# Patient Record
Sex: FEMALE | Race: WHITE | NOT HISPANIC OR LATINO | Employment: UNEMPLOYED | URBAN - METROPOLITAN AREA
[De-identification: names, ages, dates, MRNs, and addresses within clinical notes are randomized per-mention and may not be internally consistent; named-entity substitution may affect disease eponyms.]

---

## 2018-10-08 ENCOUNTER — TELEPHONE (OUTPATIENT)
Dept: INTERNAL MEDICINE | Facility: CLINIC | Age: 23
End: 2018-10-08

## 2018-10-08 DIAGNOSIS — Z29.9 PREVENTIVE MEASURE: Primary | ICD-10-CM

## 2018-10-08 NOTE — TELEPHONE ENCOUNTER
Alfred, I see this pt on Wednesday AM at 8AM.  She would like to do lab at [facility](Fax#: [phone]).  I have ordered/printed lab orders; would you please fax them to [facility] and let pt know.  Thanks so much.  
Lab orders faxed to requested number.   
Eyeglasses

## 2018-10-09 ENCOUNTER — TELEPHONE (OUTPATIENT)
Dept: INTERNAL MEDICINE | Facility: CLINIC | Age: 23
End: 2018-10-09

## 2018-10-09 NOTE — TELEPHONE ENCOUNTER
----- Message from Burt Goddard sent at 10/9/2018  9:50 AM CDT -----  Contact: [facility]/ Kelly [phone]  They nolonger do the H.Pylori antibody igg via blood anymore. They offer it via stool or breath test therefore, she needs to know which one you want her to do.    The patient is waiting.    Thank you

## 2018-10-10 NOTE — TELEPHONE ENCOUNTER
----- Message from Taina Johnson sent at 10/10/2018  9:39 AM CDT -----  Contact: self  Pt called in about wanting to reschedule appt, pt missed appt. Pt would like the nurse to give her a call back      Pt can be reached at [phone] ty

## 2018-10-10 NOTE — TELEPHONE ENCOUNTER
----- Message from Pili Waite sent at 10/10/2018  4:27 PM CDT -----  Contact: self/[phone]  Type: Returning a call    Who left a message?    When did the practice call? Today     Comments: Please advise.           Thanks

## 2018-10-17 ENCOUNTER — OFFICE VISIT (OUTPATIENT)
Dept: INTERNAL MEDICINE | Facility: CLINIC | Age: 23
End: 2018-10-17
Payer: COMMERCIAL

## 2018-10-17 VITALS
TEMPERATURE: 98 F | WEIGHT: 139.31 LBS | HEART RATE: 81 BPM | OXYGEN SATURATION: 99 % | DIASTOLIC BLOOD PRESSURE: 70 MMHG | BODY MASS INDEX: 24.68 KG/M2 | SYSTOLIC BLOOD PRESSURE: 110 MMHG | HEIGHT: 63 IN

## 2018-10-17 DIAGNOSIS — R76.8 POSITIVE ANA (ANTINUCLEAR ANTIBODY): ICD-10-CM

## 2018-10-17 DIAGNOSIS — E78.5 HYPERLIPIDEMIA, UNSPECIFIED HYPERLIPIDEMIA TYPE: Primary | ICD-10-CM

## 2018-10-17 DIAGNOSIS — Z76.89 ESTABLISHING CARE WITH NEW DOCTOR, ENCOUNTER FOR: ICD-10-CM

## 2018-10-17 DIAGNOSIS — K58.9 IRRITABLE BOWEL SYNDROME, UNSPECIFIED TYPE: ICD-10-CM

## 2018-10-17 PROCEDURE — 3008F BODY MASS INDEX DOCD: CPT | Mod: CPTII,S$GLB,, | Performed by: INTERNAL MEDICINE

## 2018-10-17 PROCEDURE — 99999 PR PBB SHADOW E&M-EST. PATIENT-LVL IV: CPT | Mod: PBBFAC,,, | Performed by: INTERNAL MEDICINE

## 2018-10-17 PROCEDURE — 99204 OFFICE O/P NEW MOD 45 MIN: CPT | Mod: S$GLB,,, | Performed by: INTERNAL MEDICINE

## 2018-10-17 RX ORDER — DROSPIRENONE AND ETHINYL ESTRADIOL 0.02-3(28)
1 KIT ORAL DAILY
COMMUNITY

## 2018-10-17 NOTE — PROGRESS NOTES
REASON FOR VISIT:  Alba is a very sweet 23-year-old female, new to me, who   presents today to establish care.    CHIEF COMPLAINT:  Establish care as a new patient.    HISTORY OF PRESENT ILLNESS:  Alba presents for the above.  She has actually   had lab work done at the [facility] to be reviewed.  She was scheduled to this   appointment at her mom's request after an episode of anxiety this past spring.    She was actually living in [place], [place] and attending the [facility] [facility] [employer].  She was having a lot of anxiety at that time with palpitations.    She went to the Emergency Room followed by seeing a cardiologist and had a   negative Emergency Room workup, Holter study and EKG performed.  She notes that   since then, she has moved back to [facility].  She is applying to start a masters   program.  She is living at home and working to try and save some money up.  She   notes she is doing much better.  Her anxiety has not really been an issue.  She   has no present complaints of anxiety or depression, feels good.  She does have a   history of positive ROSEMARY in the past.  She has no arthralgias, no rash, no   fevers or other abnormalities.  She notes she has a history of irritable bowel   syndrome as well.  She does not believe she has ever had a colonoscopy.  No   acute problems with this at this point.    PAST MEDICAL, SURGICAL AND SOCIAL HISTORY:  Please see as thoroughly stated in   EPIC chart, which has been reviewed.    REVIEW OF SYSTEMS:  HEENT:  Negative for headaches or change in vision.  No oral or dental pain.  CARDIOPULMONARY:  No chest pain.  No shortness of breath.  GASTROINTESTINAL:  Positive for occasional constipation with irritable bowel   symptoms.  No diarrhea.  No blood per rectum.  GYNECOLOGIC:  No change in bowel habits.  No menstrual difficulties at this   point.  Menses are controlled on present Maci birth control.  EXTREMITIES:  Negative for swelling.  RHEUMATOLOGIC AND ORTHOPEDIC:   No joint pains.  No limb pain.  NEUROLOGIC:  No dizziness.  No focal neurological abnormalities.  Rest of review of systems is noncontributory.    PHYSICAL EXAMINATION:  VITAL SIGNS:  Weight is 139 pounds, blood pressure is 110/70, pulse is 81 and   pulse ox is 99%.  GENERAL:  The patient looks comfortable.  HEENT:  Grossly unremarkable.  The patient does have a very small nasal ring in   place.  No evidence of infection or drainage.  Oropharynx is clear.  NECK:  Supple.  Negative for masses or thyromegaly.  Negative for   lymphadenopathy.  LUNGS:  Clear.  HEART:  Regular rate and rhythm without arrhythmias, murmurs or gallops.  ABDOMEN:  Positive bowel sounds, soft and nontender.  No masses or organomegaly.  BREASTS:  On gross visualization are normal.  On palpation, no masses or   discharge from the nipples.  EXTREMITIES:  Negative for edema.  RHEUMATOLOGIC:  Within normal limits.    WORKUP:  Lab work ordered/one at [email or web address] on 10/09/2018, showing total   cholesterol 273 with LDL cholesterol 167.  Comprehensive chemistry is   unremarkable, TSH is unremarkable.  CBC is unremarkable.  Ferritin is low at 7.    Vitamin B12 is normal.  Vitamin D is low normal at 30.    ASSESSMENT AND PLAN:  1.  Hyperlipidemia in a patient presently on a Pescatarian diet with very little   dairy product in the diet.  A.  We will recheck fasting lipid panel to ensure this is not a lab error.  B.  We will phone review after the above and determine further course of   therapy, although given the patient's age and healthy status, healthy diet and   regular exercise program, not sure that further intervention required yet.  2.  Positive ROSEMARY, clinically asymptomatic.  A.  We will recheck with antinuclear antibody titers.  3.  Irritable bowel syndrome with constipation.  A.  I have recommended a trial of Benefiber 1 packet in bottle of water, food or   drink daily.  B.  I have also recommended the patient consider a trial of  probiotics, either   Culturelle or Ultimate Elsa 10 to 15 billion daily.  4.  Iron deficiency, probably secondary to diet.  A.  I have recommended the patient take a Slow Fe (iron) tablet once daily.  5.  Health maintenance.  I have encouraged the patient to schedule annual   gynecologic appointment and establish with new doctor now that living in [place].  B.  We will try and obtain outside records from workup in [place].  C.  Phone review after repeat lab work.      FMS/IN  dd: 10/17/2018 18:00:07 (CDT)  td: 10/18/2018 15:58:02 (CDT)  Doc ID   #[id number]  Job ID #[id number]    CC:     This office note has been dictated.

## 2018-10-17 NOTE — PATIENT INSTRUCTIONS
For Irritable Bowel Syndrome:  #1-Benefiber 1 packet in bottle water or with food/drink daily +/-       Probiotics daily(Ex Cultural or Ultimate Elsa at 10-15 billion daily)

## 2019-11-12 ENCOUNTER — TELEPHONE (OUTPATIENT)
Dept: INTERNAL MEDICINE | Facility: CLINIC | Age: 24
End: 2019-11-12

## 2019-11-12 DIAGNOSIS — Z29.9 PREVENTIVE MEASURE: Primary | ICD-10-CM

## 2019-11-12 NOTE — TELEPHONE ENCOUNTER
----- Message from Colleen Fischer sent at 11/12/2019  9:08 AM CST -----  Contact: [phone]  Patient is requesting to have a Tdap shot.  Please advise, thanks

## 2019-11-19 ENCOUNTER — CLINICAL SUPPORT (OUTPATIENT)
Dept: INTERNAL MEDICINE | Facility: CLINIC | Age: 24
End: 2019-11-19
Payer: COMMERCIAL

## 2019-11-19 PROCEDURE — 90715 TDAP VACCINE GREATER THAN OR EQUAL TO 7YO IM: ICD-10-PCS | Mod: S$GLB,,, | Performed by: INTERNAL MEDICINE

## 2019-11-19 PROCEDURE — 90471 IMMUNIZATION ADMIN: CPT | Mod: S$GLB,,, | Performed by: INTERNAL MEDICINE

## 2019-11-19 PROCEDURE — 90715 TDAP VACCINE 7 YRS/> IM: CPT | Mod: S$GLB,,, | Performed by: INTERNAL MEDICINE

## 2019-11-19 PROCEDURE — 90471 TDAP VACCINE GREATER THAN OR EQUAL TO 7YO IM: ICD-10-PCS | Mod: S$GLB,,, | Performed by: INTERNAL MEDICINE

## 2019-12-02 ENCOUNTER — TELEPHONE (OUTPATIENT)
Dept: INTERNAL MEDICINE | Facility: CLINIC | Age: 24
End: 2019-12-02

## 2019-12-02 DIAGNOSIS — M25.50 ARTHRALGIA, UNSPECIFIED JOINT: Primary | ICD-10-CM

## 2019-12-02 NOTE — TELEPHONE ENCOUNTER
Alba calls and requests referral to  Dr Carol Moore, her Rheumatologist.  I have placed.  Alfred, please fax referral to on pt to Dr Abigail Moore at office phone # [phone] [address].  Thanks

## 2020-01-20 ENCOUNTER — TELEPHONE (OUTPATIENT)
Dept: INTERNAL MEDICINE | Facility: CLINIC | Age: 25
End: 2020-01-20

## 2020-01-21 PROBLEM — Z76.89 ESTABLISHING CARE WITH NEW DOCTOR, ENCOUNTER FOR: Status: RESOLVED | Noted: 2018-10-17 | Resolved: 2020-01-21

## 2020-01-21 NOTE — PROGRESS NOTES
"Subjective:      Patient ID: Alba Lopez is a 24 y.o. female.    Chief Complaint: Chest Pain (chest tightness after eating, some relief with burping, upset stomach-sour feeling, nuasea) and Gastroesophageal Reflux    Ms Willis is an established patient of Dr Andino. She is new to me.  She is here to be evaluated for GERD symptoms    She has a past medical history of IBS (irritable bowel syndrome) and Mixed anxiety and depressive disorder.    Her symptoms have been present for the last 4-5 days. She states that right after eating she feels a chest tightness. Burping helps. She is nauseous at times and feels like she has a "sour stomach". She states two days ago she also had a lump in her throat sensation after eating. It is not resolved. Of note, she typically eats a healthy plant based diet but she is staying at her mom and has had foods that have been outside her norm. She drinks herbal tea with marshmallow mer, slippery herb, and liquorice. She states that this has helped a little. Tums have also provided her with some relief.    She is active. She continues to run 2 miles 2-3 week with stretching and core exercises.  She is up to date on immunizations and reports having flu vaccine.  She has never had a colonoscopy.  She does report having a scope done at an outside facility in the past as a teenager, gall bladder subsequently removed.    Discussed Essence's symptoms with PCP. Recommend starting PPI for now, if no improvement will plan to scope.    Gastroesophageal Reflux   She complains of belching, chest pain, coughing (dry cough), globus sensation and nausea. She reports no abdominal pain, no choking, no dysphagia, no heartburn, no hoarse voice, no sore throat, no stridor, no tooth decay, no water brash or no wheezing. lump in throat noted after food . This is a new problem. The current episode started in the past 7 days. The problem has been gradually improving. The symptoms are aggravated by certain " foods and lying down. Associated symptoms include anemia. Pertinent negatives include no fatigue, melena, muscle weakness, orthopnea or weight loss. There are no known risk factors. She has tried an antacid for the symptoms. The treatment provided mild relief. Past procedures do not include an abdominal ultrasound. Past invasive treatments do not include gastroplasty, gastroplication or reflux surgery. hx of gallbladder removal.       Review of Systems   Constitutional: Negative for fatigue and weight loss.   HENT: Negative for hoarse voice and sore throat.    Respiratory: Positive for cough (dry cough). Negative for choking and wheezing.    Cardiovascular: Positive for chest pain.   Gastrointestinal: Positive for nausea. Negative for abdominal pain, dysphagia, heartburn and melena.   Musculoskeletal: Negative for muscle weakness.       Review of patient's allergies indicates:  No Known Allergies    Current Outpatient Medications   Medication Sig Dispense Refill    drospirenone-ethinyl estradiol (MONICA) 3-0.02 mg per tablet Take 1 tablet by mouth once daily.      MULTIVITAMIN ORAL Take 1 tablet by mouth once daily. Ritual brand      NON FORMULARY MEDICATION [place]uber      esomeprazole (NEXIUM) 20 MG capsule Take 1 capsule (20 mg total) by mouth before breakfast. 30 capsule 3     No current facility-administered medications for this visit.        There are no active problems to display for this patient.      Past Medical History:   Diagnosis Date    IBS (irritable bowel syndrome)     Mixed anxiety and depressive disorder     Situational       Past Surgical History:   Procedure Laterality Date    CHOLECYSTECTOMY         Family History   Problem Relation Age of Onset    Hyperlipidemia Mother     Crohn's disease Brother     Hypertension Maternal Grandmother     Cancer Maternal Grandfather         Colon Cancer         Objective:     Lab Results   Component Value Date    WBC 5.76 01/22/2020    HGB 14.1 01/22/2020  "   HCT 43.9 01/22/2020     01/22/2020    CHOL 277 (H) 01/22/2020    TRIG 223 (H) 01/22/2020    HDL 70 01/22/2020    ALT 11 01/22/2020    AST 16 01/22/2020     01/22/2020    K 4.7 01/22/2020     01/22/2020    CREATININE 0.8 01/22/2020    BUN 6 01/22/2020    CO2 26 01/22/2020       Vitals:    01/22/20 0840   BP: 122/74   Pulse: 74   SpO2: 98%   Weight: 62.9 kg (138 lb 10.7 oz)   Height: 5' 3" (1.6 m)   PainSc: 0-No pain       Body mass index is 24.56 kg/m².    Physical Exam   Constitutional: She is oriented to person, place, and time. She appears well-developed and well-nourished.   HENT:   Head: Normocephalic and atraumatic.   Eyes: Conjunctivae and EOM are normal.   Neck: Normal range of motion. No thyromegaly present.   Cardiovascular: Normal rate, regular rhythm, normal heart sounds and intact distal pulses.   Pulmonary/Chest: Effort normal and breath sounds normal. No respiratory distress.   Abdominal: Soft. Bowel sounds are normal. She exhibits no distension and no mass. There is no tenderness. There is no rebound and no guarding. No hernia.   Musculoskeletal: Normal range of motion.   Neurological: She is alert and oriented to person, place, and time.   Skin: Skin is warm and dry.   Psychiatric: She has a normal mood and affect. Her behavior is normal. Judgment and thought content normal.     Assessment:     1. Health care maintenance    2. Gastroesophageal reflux disease, esophagitis presence not specified      Plan:     Alba was seen today for chest pain and gastroesophageal reflux.    Diagnoses and all orders for this visit:    Health care maintenance  -     Comprehensive metabolic panel; Future  -     CBC auto differential; Future  -     TSH; Future  -     Lipid panel; Future  -     Vitamin D; Future  -     Iron; Future  -     Ferritin; Future    Gastroesophageal reflux disease, esophagitis presence not specified  -     esomeprazole (NEXIUM) 20 MG capsule; Take 1 capsule (20 mg total) " by mouth before breakfast.      GERD:  Lifestyle and dietary modifications  Antacids at time of meals  PPI trial for 6 weeks  Return if not impoved.  Baseline labs today  Health Maintenance   Topic Date Due    Lipid Panel  1994    Pap Smear  04/22/2015    TETANUS VACCINE  11/19/2029    HPV Vaccines  Completed       Patient Instructions     Labs today.  Nexium 20 mg daily for 6 weeks. Will scope if no improvement.        Tips to Control Acid Reflux    To control acid reflux, youll need to make some basic diet and lifestyle changes. The simple steps outlined below may be all youll need to ease discomfort.  Watch what you eat  · Avoid fatty foods and spicy foods.  · Eat fewer acidic foods, such as citrus and tomato-based foods. These can increase symptoms.  · Limit drinking alcohol, caffeine, and fizzy beverages. All increase acid reflux.  · Try limiting chocolate, peppermint, and spearmint. These can worsen acid reflux in some people.  Watch when you eat  · Avoid lying down for 3 hours after eating.  · Do not snack before going to bed.  Raise your head  Raising your head and upper body by 4 to 6 inches helps limit reflux when youre lying down. Put blocks under the head of your bed frame to raise it.  Other changes  · Lose weight, if you need to  · Dont exercise near bedtime  · Avoid tight-fitting clothes  · Limit aspirin and ibuprofen  · Stop smoking   Date Last Reviewed: 7/1/2016 © 2000-2017 [employer]. [address] [place] [address], [place] [address]. All rights reserved. This information is not intended as a substitute for professional medical care. Always follow your healthcare professional's instructions.        Medicines for Acid Reflux  Your healthcare provider has told you that you have acid reflux. This condition causes stomach acid to wash up into your throat. For most people, acid reflux is troubling but not dangerous. But left untreated, acid reflux sometimes damages the esophagus.  Medicines can help control acid reflux and limit your risk of future problems.  Medicines for acid reflux  Your healthcare provider may prescribe medicine to help treat your acid reflux. Medicine will be based on your symptoms and any test results. Your provider will explain how to take your medicine. You will also be told about possible side effects.  Reducing stomach acid  Your provider may suggest antacids that you can buy over the counter. Antacids can give fast relief. Or you may be told to take a type of medicine called H2 blockers. These are available over the counter and by prescription (for higher doses).  Blocking stomach acid  In more severe cases, your healthcare provider may suggest stronger medicines such as proton pump inhibitors (PPIs). These keep the stomach from making acid. They are often prescribed for long-term use.  Other medicines  In some cases medicines to reduce or block stomach acid may not work. Then you may be switched to another type of medicine that helps your stomach empty better.     Date Last Reviewed: 10/1/2016  © 2928-7777 [employer]. [address] [place] [address], [place] [address]. All rights reserved. This information is not intended as a substitute for professional medical care. Always follow your healthcare professional's instructions.        GERD (Adult)    The esophagus is a tube that carries food from the mouth to the stomach. A valve at the lower end of the esophagus prevents stomach acid from flowing upward. When this valve doesn't work properly, stomach contents may repeatedly flow back up (reflux) into the esophagus. This is called gastroesophageal reflux disease (GERD). GERD can irritate the esophagus. It can cause problems with swallowing or breathing. In severe cases, GERD can cause recurrent pneumonia or other serious problems.  Symptoms of reflux include burning, pressure or sharp pain in the upper abdomen or mid to lower chest. The pain can spread to the  "neck, back, or shoulder. There may be belching, an acid taste in the back of the throat, chronic cough, or sore throat or hoarseness. GERD symptoms often occur during the day after a big meal. They can also occur at night when lying down.   Home care  Lifestyle changes can help reduce symptoms. If needed, medicines may be prescribed. Symptoms often improve with treatment, but if treatment is stopped, the symptoms often return after a few months. So most persons with GERD will need to continue treatment.  Lifestyle changes  · Limit or avoid fatty, fried, and spicy foods, as well as coffee, chocolate, mint, and foods with high acid content such as tomatoes and citrus fruit and juices (orange, grapefruit, lemon).  · Dont eat large meals, especially at night. Frequent, smaller meals are best. Do not lie down right after eating. And dont eat anything 3 hours before going to bed.  · Avoid drinking alcohol and smoking. As much as possible, stay away from second hand smoke.  · If you are overweight, losing weight will reduce symptoms.   · Avoid wearing tight clothing around your stomach area.  · If your symptoms occur during sleep, use a foam wedge to elevate your upper body (not just your head.) Or, place 4" blocks under the head of your bed.  Medicines  If needed, medicines can help relieve the symptoms of GERD and prevent damage to the esophagus. Discuss a medicine plan with your healthcare provider. This may include one or more of the following medicines:  · Antacids to help neutralize the normal acids in your stomach.  · Acid blockers (H2 blockers) to decrease acid production.  · Acid inhibitors (PPIs) to decrease acid production in a different way than the blockers. They may work better, but can take a little longer to take effect.  Take an antacid 30-60 minutes after eating and at bedtime, but not at the same time as an acid blocker.  Try not to take medicines such as ibuprofen and aspirin. If you are taking " aspirin for your heart or other medical reasons, talk to your healthcare provider about stopping it.  Follow-up care  Follow up with your healthcare provider or as advised by our staff.  When to seek medical advice  Call your healthcare provider if any of the following occur:  · Stomach pain gets worse or moves to the lower right abdomen (appendix area)  · Chest pain appears or gets worse, or spreads to the back, neck, shoulder, or arm  · Frequent vomiting (cant keep down liquids)  · Blood in the stool or vomit (red or black in color)  · Feeling weak or dizzy  · Fever of 100.4ºF (38ºC) or higher, or as directed by your healthcare provider  Date Last Reviewed: 6/23/2015  © 6780-6021 [employer]. [address] [place] [address], [place] [address]. All rights reserved. This information is not intended as a substitute for professional medical care. Always follow your healthcare professional's instructions.

## 2020-01-21 NOTE — PATIENT INSTRUCTIONS
Labs today.  Nexium 20 mg daily for 6 weeks. Will scope if no improvement.  Will repeat lipid panel next week.    GERD:  Lifestyle and dietary modifications  PPI trial for 6 weeks  Return if not impoved.  Baseline labs today  Report if symptoms worsen or fail to improve.      Tips to Control Acid Reflux    To control acid reflux, youll need to make some basic diet and lifestyle changes. The simple steps outlined below may be all youll need to ease discomfort.  Watch what you eat  · Avoid fatty foods and spicy foods.  · Eat fewer acidic foods, such as citrus and tomato-based foods. These can increase symptoms.  · Limit drinking alcohol, caffeine, and fizzy beverages. All increase acid reflux.  · Try limiting chocolate, peppermint, and spearmint. These can worsen acid reflux in some people.  Watch when you eat  · Avoid lying down for 3 hours after eating.  · Do not snack before going to bed.  Raise your head  Raising your head and upper body by 4 to 6 inches helps limit reflux when youre lying down. Put blocks under the head of your bed frame to raise it.  Other changes  · Lose weight, if you need to  · Dont exercise near bedtime  · Avoid tight-fitting clothes  · Limit aspirin and ibuprofen  · Stop smoking   Date Last Reviewed: 7/1/2016  © 8306-5674 [employer]. [address] [place] [address], [place] [address]. All rights reserved. This information is not intended as a substitute for professional medical care. Always follow your healthcare professional's instructions.        Medicines for Acid Reflux  Your healthcare provider has told you that you have acid reflux. This condition causes stomach acid to wash up into your throat. For most people, acid reflux is troubling but not dangerous. But left untreated, acid reflux sometimes damages the esophagus. Medicines can help control acid reflux and limit your risk of future problems.  Medicines for acid reflux  Your healthcare provider may prescribe medicine to  help treat your acid reflux. Medicine will be based on your symptoms and any test results. Your provider will explain how to take your medicine. You will also be told about possible side effects.  Reducing stomach acid  Your provider may suggest antacids that you can buy over the counter. Antacids can give fast relief. Or you may be told to take a type of medicine called H2 blockers. These are available over the counter and by prescription (for higher doses).  Blocking stomach acid  In more severe cases, your healthcare provider may suggest stronger medicines such as proton pump inhibitors (PPIs). These keep the stomach from making acid. They are often prescribed for long-term use.  Other medicines  In some cases medicines to reduce or block stomach acid may not work. Then you may be switched to another type of medicine that helps your stomach empty better.     Date Last Reviewed: 10/1/2016  © 9979-0090 [employer]. [address] [place] [address]. All rights reserved. This information is not intended as a substitute for professional medical care. Always follow your healthcare professional's instructions.        GERD (Adult)    The esophagus is a tube that carries food from the mouth to the stomach. A valve at the lower end of the esophagus prevents stomach acid from flowing upward. When this valve doesn't work properly, stomach contents may repeatedly flow back up (reflux) into the esophagus. This is called gastroesophageal reflux disease (GERD). GERD can irritate the esophagus. It can cause problems with swallowing or breathing. In severe cases, GERD can cause recurrent pneumonia or other serious problems.  Symptoms of reflux include burning, pressure or sharp pain in the upper abdomen or mid to lower chest. The pain can spread to the neck, back, or shoulder. There may be belching, an acid taste in the back of the throat, chronic cough, or sore throat or hoarseness. GERD symptoms often  "occur during the day after a big meal. They can also occur at night when lying down.   Home care  Lifestyle changes can help reduce symptoms. If needed, medicines may be prescribed. Symptoms often improve with treatment, but if treatment is stopped, the symptoms often return after a few months. So most persons with GERD will need to continue treatment.  Lifestyle changes  · Limit or avoid fatty, fried, and spicy foods, as well as coffee, chocolate, mint, and foods with high acid content such as tomatoes and citrus fruit and juices (orange, grapefruit, lemon).  · Dont eat large meals, especially at night. Frequent, smaller meals are best. Do not lie down right after eating. And dont eat anything 3 hours before going to bed.  · Avoid drinking alcohol and smoking. As much as possible, stay away from second hand smoke.  · If you are overweight, losing weight will reduce symptoms.   · Avoid wearing tight clothing around your stomach area.  · If your symptoms occur during sleep, use a foam wedge to elevate your upper body (not just your head.) Or, place 4" blocks under the head of your bed.  Medicines  If needed, medicines can help relieve the symptoms of GERD and prevent damage to the esophagus. Discuss a medicine plan with your healthcare provider. This may include one or more of the following medicines:  · Antacids to help neutralize the normal acids in your stomach.  · Acid blockers (H2 blockers) to decrease acid production.  · Acid inhibitors (PPIs) to decrease acid production in a different way than the blockers. They may work better, but can take a little longer to take effect.  Take an antacid 30-60 minutes after eating and at bedtime, but not at the same time as an acid blocker.  Try not to take medicines such as ibuprofen and aspirin. If you are taking aspirin for your heart or other medical reasons, talk to your healthcare provider about stopping it.  Follow-up care  Follow up with your healthcare provider or " as advised by our staff.  When to seek medical advice  Call your healthcare provider if any of the following occur:  · Stomach pain gets worse or moves to the lower right abdomen (appendix area)  · Chest pain appears or gets worse, or spreads to the back, neck, shoulder, or arm  · Frequent vomiting (cant keep down liquids)  · Blood in the stool or vomit (red or black in color)  · Feeling weak or dizzy  · Fever of 100.4ºF (38ºC) or higher, or as directed by your healthcare provider  Date Last Reviewed: 6/23/2015  © 1897-3855 [employer]. [address] [place] [address]. All rights reserved. This information is not intended as a substitute for professional medical care. Always follow your healthcare professional's instructions.

## 2020-01-21 NOTE — TELEPHONE ENCOUNTER
Alfred/Salvatore, this pt is having GERD like symptoms.  Does Radha have time to see her this Wednesday when I'm here?

## 2020-01-22 ENCOUNTER — OFFICE VISIT (OUTPATIENT)
Dept: INTERNAL MEDICINE | Facility: CLINIC | Age: 25
End: 2020-01-22
Payer: COMMERCIAL

## 2020-01-22 ENCOUNTER — LAB VISIT (OUTPATIENT)
Dept: LAB | Facility: HOSPITAL | Age: 25
End: 2020-01-22
Attending: INTERNAL MEDICINE
Payer: COMMERCIAL

## 2020-01-22 VITALS
DIASTOLIC BLOOD PRESSURE: 74 MMHG | OXYGEN SATURATION: 98 % | BODY MASS INDEX: 24.57 KG/M2 | HEART RATE: 74 BPM | WEIGHT: 138.69 LBS | HEIGHT: 63 IN | SYSTOLIC BLOOD PRESSURE: 122 MMHG

## 2020-01-22 DIAGNOSIS — K21.9 GASTROESOPHAGEAL REFLUX DISEASE, ESOPHAGITIS PRESENCE NOT SPECIFIED: ICD-10-CM

## 2020-01-22 DIAGNOSIS — Z00.00 HEALTH CARE MAINTENANCE: Primary | ICD-10-CM

## 2020-01-22 DIAGNOSIS — Z00.00 HEALTH CARE MAINTENANCE: ICD-10-CM

## 2020-01-22 LAB
25(OH)D3+25(OH)D2 SERPL-MCNC: 33 NG/ML (ref 30–96)
ALBUMIN SERPL BCP-MCNC: 3.9 G/DL (ref 3.5–5.2)
ALP SERPL-CCNC: 114 U/L (ref 55–135)
ALT SERPL W/O P-5'-P-CCNC: 11 U/L (ref 10–44)
ANION GAP SERPL CALC-SCNC: 9 MMOL/L (ref 8–16)
AST SERPL-CCNC: 16 U/L (ref 10–40)
BASOPHILS # BLD AUTO: 0.05 K/UL (ref 0–0.2)
BASOPHILS NFR BLD: 0.9 % (ref 0–1.9)
BILIRUB SERPL-MCNC: 0.3 MG/DL (ref 0.1–1)
BUN SERPL-MCNC: 6 MG/DL (ref 6–20)
CALCIUM SERPL-MCNC: 9.9 MG/DL (ref 8.7–10.5)
CHLORIDE SERPL-SCNC: 107 MMOL/L (ref 95–110)
CHOLEST SERPL-MCNC: 277 MG/DL (ref 120–199)
CHOLEST/HDLC SERPL: 4 {RATIO} (ref 2–5)
CO2 SERPL-SCNC: 26 MMOL/L (ref 23–29)
CREAT SERPL-MCNC: 0.8 MG/DL (ref 0.5–1.4)
DIFFERENTIAL METHOD: NORMAL
EOSINOPHIL # BLD AUTO: 0.1 K/UL (ref 0–0.5)
EOSINOPHIL NFR BLD: 1 % (ref 0–8)
ERYTHROCYTE [DISTWIDTH] IN BLOOD BY AUTOMATED COUNT: 12.8 % (ref 11.5–14.5)
EST. GFR  (AFRICAN AMERICAN): >60 ML/MIN/1.73 M^2
EST. GFR  (NON AFRICAN AMERICAN): >60 ML/MIN/1.73 M^2
FERRITIN SERPL-MCNC: 11 NG/ML (ref 20–300)
GLUCOSE SERPL-MCNC: 84 MG/DL (ref 70–110)
HCT VFR BLD AUTO: 43.9 % (ref 37–48.5)
HDLC SERPL-MCNC: 70 MG/DL (ref 40–75)
HDLC SERPL: 25.3 % (ref 20–50)
HGB BLD-MCNC: 14.1 G/DL (ref 12–16)
IMM GRANULOCYTES # BLD AUTO: 0.01 K/UL (ref 0–0.04)
IMM GRANULOCYTES NFR BLD AUTO: 0.2 % (ref 0–0.5)
IRON SERPL-MCNC: 52 UG/DL (ref 30–160)
LDLC SERPL CALC-MCNC: 162.4 MG/DL (ref 63–159)
LYMPHOCYTES # BLD AUTO: 2 K/UL (ref 1–4.8)
LYMPHOCYTES NFR BLD: 34 % (ref 18–48)
MCH RBC QN AUTO: 28.3 PG (ref 27–31)
MCHC RBC AUTO-ENTMCNC: 32.1 G/DL (ref 32–36)
MCV RBC AUTO: 88 FL (ref 82–98)
MONOCYTES # BLD AUTO: 0.4 K/UL (ref 0.3–1)
MONOCYTES NFR BLD: 6.9 % (ref 4–15)
NEUTROPHILS # BLD AUTO: 3.3 K/UL (ref 1.8–7.7)
NEUTROPHILS NFR BLD: 57 % (ref 38–73)
NONHDLC SERPL-MCNC: 207 MG/DL
NRBC BLD-RTO: 0 /100 WBC
PLATELET # BLD AUTO: 249 K/UL (ref 150–350)
PMV BLD AUTO: 10.9 FL (ref 9.2–12.9)
POTASSIUM SERPL-SCNC: 4.7 MMOL/L (ref 3.5–5.1)
PROT SERPL-MCNC: 7.8 G/DL (ref 6–8.4)
RBC # BLD AUTO: 4.98 M/UL (ref 4–5.4)
SODIUM SERPL-SCNC: 142 MMOL/L (ref 136–145)
TRIGL SERPL-MCNC: 223 MG/DL (ref 30–150)
TSH SERPL DL<=0.005 MIU/L-ACNC: 2.65 UIU/ML (ref 0.4–4)
WBC # BLD AUTO: 5.76 K/UL (ref 3.9–12.7)

## 2020-01-22 PROCEDURE — 3008F PR BODY MASS INDEX (BMI) DOCUMENTED: ICD-10-PCS | Mod: CPTII,S$GLB,, | Performed by: NURSE PRACTITIONER

## 2020-01-22 PROCEDURE — 85025 COMPLETE CBC W/AUTO DIFF WBC: CPT

## 2020-01-22 PROCEDURE — 99214 OFFICE O/P EST MOD 30 MIN: CPT | Mod: S$GLB,,, | Performed by: NURSE PRACTITIONER

## 2020-01-22 PROCEDURE — 82306 VITAMIN D 25 HYDROXY: CPT

## 2020-01-22 PROCEDURE — 99999 PR PBB SHADOW E&M-EST. PATIENT-LVL III: ICD-10-PCS | Mod: PBBFAC,,, | Performed by: NURSE PRACTITIONER

## 2020-01-22 PROCEDURE — 84443 ASSAY THYROID STIM HORMONE: CPT

## 2020-01-22 PROCEDURE — 99214 PR OFFICE/OUTPT VISIT, EST, LEVL IV, 30-39 MIN: ICD-10-PCS | Mod: S$GLB,,, | Performed by: NURSE PRACTITIONER

## 2020-01-22 PROCEDURE — 80061 LIPID PANEL: CPT

## 2020-01-22 PROCEDURE — 80053 COMPREHEN METABOLIC PANEL: CPT

## 2020-01-22 PROCEDURE — 99999 PR PBB SHADOW E&M-EST. PATIENT-LVL III: CPT | Mod: PBBFAC,,, | Performed by: NURSE PRACTITIONER

## 2020-01-22 PROCEDURE — 36415 COLL VENOUS BLD VENIPUNCTURE: CPT

## 2020-01-22 PROCEDURE — 82728 ASSAY OF FERRITIN: CPT

## 2020-01-22 PROCEDURE — 3008F BODY MASS INDEX DOCD: CPT | Mod: CPTII,S$GLB,, | Performed by: NURSE PRACTITIONER

## 2020-01-22 PROCEDURE — 83540 ASSAY OF IRON: CPT

## 2020-01-22 RX ORDER — HYDROGEN PEROXIDE 3 %
20 SOLUTION, NON-ORAL MISCELLANEOUS
Qty: 30 CAPSULE | Refills: 3 | Status: SHIPPED | OUTPATIENT
Start: 2020-01-22 | End: 2020-03-12

## 2020-01-24 DIAGNOSIS — E78.1 HYPERTRIGLYCERIDEMIA: Primary | ICD-10-CM

## 2020-01-24 NOTE — PROGRESS NOTES
Patient has elevated triglycerides.  Will repeat labs  
What Type Of Note Output Would You Prefer (Optional)?: Bullet Format
How Severe Is Your Acne?: mild
Is This A New Presentation, Or A Follow-Up?: Acne

## 2020-01-27 ENCOUNTER — LAB VISIT (OUTPATIENT)
Dept: LAB | Facility: HOSPITAL | Age: 25
End: 2020-01-27
Attending: NURSE PRACTITIONER
Payer: COMMERCIAL

## 2020-01-27 ENCOUNTER — TELEPHONE (OUTPATIENT)
Dept: INTERNAL MEDICINE | Facility: CLINIC | Age: 25
End: 2020-01-27

## 2020-01-27 ENCOUNTER — PATIENT MESSAGE (OUTPATIENT)
Dept: INTERNAL MEDICINE | Facility: CLINIC | Age: 25
End: 2020-01-27

## 2020-01-27 DIAGNOSIS — E78.1 HYPERTRIGLYCERIDEMIA: ICD-10-CM

## 2020-01-27 LAB
CHOLEST SERPL-MCNC: 249 MG/DL (ref 120–199)
CHOLEST/HDLC SERPL: 3.9 {RATIO} (ref 2–5)
HDLC SERPL-MCNC: 64 MG/DL (ref 40–75)
HDLC SERPL: 25.7 % (ref 20–50)
LDLC SERPL CALC-MCNC: 140.8 MG/DL (ref 63–159)
NONHDLC SERPL-MCNC: 185 MG/DL
TRIGL SERPL-MCNC: 221 MG/DL (ref 30–150)

## 2020-01-27 PROCEDURE — 36415 COLL VENOUS BLD VENIPUNCTURE: CPT

## 2020-01-27 PROCEDURE — 80061 LIPID PANEL: CPT

## 2020-01-27 NOTE — TELEPHONE ENCOUNTER
----- Message from Carina Loyola MA sent at 1/27/2020  8:09 AM CST -----  Contact: [phone]  Patient was seen on 01/22/2020....Patient was prescribe (NEXIUM) 20 MG capsule.  She is having side affects...diarrhea, chest pain, and headaches.    Patient stated that she stop taking Nexium on Friday, and started to take Prilosec 20 mg OTC.  Also, taking the Prilosec....she didn't experience headaches.

## 2020-03-12 DIAGNOSIS — K29.70 GASTRITIS, PRESENCE OF BLEEDING UNSPECIFIED, UNSPECIFIED CHRONICITY, UNSPECIFIED GASTRITIS TYPE: Primary | ICD-10-CM

## 2020-03-12 RX ORDER — PANTOPRAZOLE SODIUM 40 MG/1
40 TABLET, DELAYED RELEASE ORAL DAILY
Qty: 30 TABLET | Refills: 6 | Status: SHIPPED | OUTPATIENT
Start: 2020-03-12 | End: 2023-06-12 | Stop reason: SDUPTHER

## 2020-04-29 ENCOUNTER — PATIENT MESSAGE (OUTPATIENT)
Dept: INTERNAL MEDICINE | Facility: CLINIC | Age: 25
End: 2020-04-29

## 2020-04-29 DIAGNOSIS — K21.9 GASTROESOPHAGEAL REFLUX DISEASE, ESOPHAGITIS PRESENCE NOT SPECIFIED: Primary | ICD-10-CM

## 2020-06-29 ENCOUNTER — TELEPHONE (OUTPATIENT)
Dept: GASTROENTEROLOGY | Facility: CLINIC | Age: 25
End: 2020-06-29

## 2020-06-29 ENCOUNTER — TELEPHONE (OUTPATIENT)
Dept: INTERNAL MEDICINE | Facility: CLINIC | Age: 25
End: 2020-06-29

## 2020-06-29 NOTE — TELEPHONE ENCOUNTER
Spoke with patient and confirmed that she wanted to cancel her appointment with JUNI eVlazquez.  Appt already cancelled in Epic.    ----- Message from Jamilah Collazo sent at 6/29/2020 12:04 PM CDT -----  Regarding: GASTROENTEROLOGY referral  Contact: RASTA  I spoke with the patient at [phone] to verify insurance coverage. She states she does not have insurance at this and she no longer lives in [place] and would like to cancel her appointment. Please have someone from your office contact the patient to confirm the cancellation. [facility] can not cancel or reschedule any appointments. The GASTROENTEROLOGY referral is closed.

## 2020-06-29 NOTE — TELEPHONE ENCOUNTER
----- Message from Jamilah Collazo sent at 6/29/2020 12:04 PM CDT -----  Regarding: GASTROENTEROLOGY referral  Contact: [facility]  I spoke with the patient at [phone] to verify insurance coverage. She states she does not have insurance at this and she no longer lives in [place] and would like to cancel her appointment. Please have someone from your office contact the patient to confirm the cancellation. [facility] can not cancel or reschedule any appointments. The GASTROENTEROLOGY referral is closed.

## 2020-12-29 ENCOUNTER — PATIENT OUTREACH (OUTPATIENT)
Dept: ADMINISTRATIVE | Facility: HOSPITAL | Age: 25
End: 2020-12-29

## 2020-12-29 DIAGNOSIS — Z11.59 NEED FOR HEPATITIS C SCREENING TEST: Primary | ICD-10-CM

## 2021-04-05 ENCOUNTER — PATIENT MESSAGE (OUTPATIENT)
Dept: ADMINISTRATIVE | Facility: HOSPITAL | Age: 26
End: 2021-04-05

## 2021-07-06 ENCOUNTER — PATIENT MESSAGE (OUTPATIENT)
Dept: ADMINISTRATIVE | Facility: HOSPITAL | Age: 26
End: 2021-07-06

## 2021-10-05 ENCOUNTER — PATIENT MESSAGE (OUTPATIENT)
Dept: ADMINISTRATIVE | Facility: HOSPITAL | Age: 26
End: 2021-10-05

## 2023-06-12 DIAGNOSIS — K29.70 GASTRITIS, PRESENCE OF BLEEDING UNSPECIFIED, UNSPECIFIED CHRONICITY, UNSPECIFIED GASTRITIS TYPE: ICD-10-CM

## 2023-06-12 RX ORDER — PANTOPRAZOLE SODIUM 40 MG/1
40 TABLET, DELAYED RELEASE ORAL DAILY
Qty: 30 TABLET | Refills: 6 | Status: SHIPPED | OUTPATIENT
Start: 2023-06-12 | End: 2024-06-11

## 2023-06-12 NOTE — TELEPHONE ENCOUNTER
No care due was identified.  [facility] Embedded Care Due Messages. Reference number: [id number].   6/12/2023 9:37:55 AM CDT

## 2024-05-13 ENCOUNTER — TELEPHONE (OUTPATIENT)
Dept: INTERNAL MEDICINE | Facility: CLINIC | Age: 29
End: 2024-05-13

## 2024-05-13 NOTE — TELEPHONE ENCOUNTER
Essence calls requesting Cold sore medication.  Tracy-would you please call(Cell phone)and ask her what pharmacy I should send to.  Thanks so much

## 2024-05-13 NOTE — TELEPHONE ENCOUNTER
LVM for pt to let her know that we needed to know that pharmacy she wanted her rx sent to. I told her she could leave the message wit the phone staff

## 2024-06-18 ENCOUNTER — TELEPHONE (OUTPATIENT)
Dept: INTERNAL MEDICINE | Facility: CLINIC | Age: 29
End: 2024-06-18

## 2024-06-18 NOTE — LETTER
June 18, 2024      [facility]  [address].  [place] [address]  Phone: [phone]  Fax: [phone]       Patient: Alba Lopez   YOB: 1995  Date of Visit: 06/18/2024    To Whom It May Concern:    Antonio Lopez  was diagnosed with COVID 6/3/2024. The patient was able to return to work/school on 6/10/24 with no restrictions.  PLease excuse her from 6/3-6/7 due to illness above. If you have any questions or concerns, or if I can be of further assistance, please do not hesitate to contact me.    Sincerely,     Tia Andino MD

## 2024-06-18 NOTE — TELEPHONE ENCOUNTER
Monica is s/p COVID dx'd by home test 6/3. She had F/C, H/As,cough, fatigue and was out of work from 6/3/24 through 6/7/24.  She returned to work 6/10 and needs Letter for work. Letter printed.

## 2024-09-04 ENCOUNTER — OFFICE VISIT (OUTPATIENT)
Dept: INTERNAL MEDICINE | Facility: CLINIC | Age: 29
End: 2024-09-04
Payer: MEDICAID

## 2024-09-04 VITALS
WEIGHT: 138.69 LBS | SYSTOLIC BLOOD PRESSURE: 98 MMHG | HEART RATE: 78 BPM | RESPIRATION RATE: 18 BRPM | HEIGHT: 63 IN | TEMPERATURE: 98 F | OXYGEN SATURATION: 95 % | BODY MASS INDEX: 24.57 KG/M2 | DIASTOLIC BLOOD PRESSURE: 66 MMHG

## 2024-09-04 DIAGNOSIS — K58.9 IRRITABLE BOWEL SYNDROME, UNSPECIFIED TYPE: ICD-10-CM

## 2024-09-04 DIAGNOSIS — K21.9 GASTROESOPHAGEAL REFLUX DISEASE, UNSPECIFIED WHETHER ESOPHAGITIS PRESENT: ICD-10-CM

## 2024-09-04 DIAGNOSIS — R10.9 ABDOMINAL PAIN, UNSPECIFIED ABDOMINAL LOCATION: Primary | ICD-10-CM

## 2024-09-04 LAB
AMORPH CRY UR QL COMP ASSIST: NORMAL
BACTERIA #/AREA URNS AUTO: NORMAL /HPF
MICROSCOPIC COMMENT: NORMAL
SQUAMOUS #/AREA URNS AUTO: 1 /HPF

## 2024-09-04 PROCEDURE — 99999 PR PBB SHADOW E&M-EST. PATIENT-LVL IV: CPT | Mod: PBBFAC,,, | Performed by: INTERNAL MEDICINE

## 2024-09-04 PROCEDURE — 3008F BODY MASS INDEX DOCD: CPT | Mod: CPTII,,, | Performed by: INTERNAL MEDICINE

## 2024-09-04 PROCEDURE — 99214 OFFICE O/P EST MOD 30 MIN: CPT | Mod: PBBFAC,PO | Performed by: INTERNAL MEDICINE

## 2024-09-04 PROCEDURE — 81001 URINALYSIS AUTO W/SCOPE: CPT | Performed by: INTERNAL MEDICINE

## 2024-09-04 PROCEDURE — 3074F SYST BP LT 130 MM HG: CPT | Mod: CPTII,,, | Performed by: INTERNAL MEDICINE

## 2024-09-04 PROCEDURE — 99213 OFFICE O/P EST LOW 20 MIN: CPT | Mod: S$PBB,,, | Performed by: INTERNAL MEDICINE

## 2024-09-04 PROCEDURE — 3078F DIAST BP <80 MM HG: CPT | Mod: CPTII,,, | Performed by: INTERNAL MEDICINE

## 2024-09-04 PROCEDURE — 87086 URINE CULTURE/COLONY COUNT: CPT | Performed by: INTERNAL MEDICINE

## 2024-09-04 PROCEDURE — 1159F MED LIST DOCD IN RCRD: CPT | Mod: CPTII,,, | Performed by: INTERNAL MEDICINE

## 2024-09-04 RX ORDER — DICYCLOMINE HYDROCHLORIDE 10 MG/1
CAPSULE ORAL
Qty: 30 CAPSULE | Refills: 0 | Status: SHIPPED | OUTPATIENT
Start: 2024-09-04

## 2024-09-04 NOTE — PROGRESS NOTES
"Subjective:       Patient ID: Alba Lopez is a 29 y.o. female.    Chief Complaint:   Abdominal Pain    HPI: Monica presents today with c/o Abdominal pain  She c/o about a 1 month hx abdominal pain.  The pain was primarily in the lower quadrants; she has had alternating stools with constipation and diarrhea, + relief with having a BM, No BPR or melena   She described sx as "pressure-like" and c/w "gas". No N/V  No F/C  Work has been stressful/Quite job 1 week ago, so now looking for new job    S/p Gyn Eval/Dr Janie Robles at [facility] 2 weeks ago and had a negative pelvic U/S(8/27)    Past Medical, Surgical, Social History: Please see as stated in Epic chart which has been reviewed.    Current Outpatient Medications   Medication Sig Dispense Refill    MULTIVITAMIN ORAL Take 1 tablet by mouth once daily. Ritual brand      dicyclomine (BENTYL) 10 MG capsule 1 cap every 6-8 hours as needed 30 capsule 0    NON FORMULARY MEDICATION [place]      pantoprazole (PROTONIX) 40 MG tablet Take 1 tablet (40 mg total) by mouth once daily. 30 tablet 6     No current facility-administered medications for this visit.       Review of Systems   Constitutional:  Negative for fever.   Respiratory:  Negative for chest tightness.    Cardiovascular:  Negative for chest pain.   Gastrointestinal:  Positive for abdominal pain, constipation and diarrhea. Negative for blood in stool.        +GERD   Psychiatric/Behavioral:  The patient is nervous/anxious.         + Situational Stress       Objective:      Lab Results   Component Value Date    WBC 5.76 01/22/2020    HGB 14.1 01/22/2020    HCT 43.9 01/22/2020     01/22/2020    CHOL 249 (H) 01/27/2020    TRIG 221 (H) 01/27/2020    HDL 64 01/27/2020    ALT 11 01/22/2020    AST 16 01/22/2020     01/22/2020    K 4.7 01/22/2020     01/22/2020    CREATININE 0.8 01/22/2020    BUN 6 01/22/2020    CO2 26 01/22/2020    TSH 2.655 01/22/2020     Physical Exam  Vitals reviewed.   Constitutional:  " "     Appearance: Normal appearance.   Cardiovascular:      Rate and Rhythm: Normal rate and regular rhythm.   Pulmonary:      Breath sounds: Normal breath sounds.   Abdominal:      General: Bowel sounds are normal.      Palpations: Abdomen is soft. There is no mass.      Comments: +Mild if any tenderness in LLQuad-no rebound or guarding   Musculoskeletal:      Right lower leg: No edema.      Left lower leg: No edema.   Skin:     General: Skin is warm and dry.   Neurological:      Mental Status: She is alert.   Psychiatric:         Mood and Affect: Mood normal.           Vital Signs  Temp: 97.7 °F (36.5 °C)  Temp Source: Other (see comments)  Pulse: 78  Resp: 18  SpO2: 95 %  BP: 98/66  BP Location: Left arm  Patient Position: Sitting  Height and Weight  Height: 5' 3" (160 cm)  Weight: 62.9 kg (138 lb 10.7 oz)  BSA (Calculated - sq m): 1.67 sq meters  BMI (Calculated): 24.6  Weight in (lb) to have BMI = 25: 140.8    Assessment:       1. Abdominal pain, unspecified abdominal location    2. Irritable bowel syndrome, unspecified type    3. Gastroesophageal reflux disease, unspecified whether esophagitis present        Plan:     Health Maintenance   Topic Date Due    Hepatitis C Screening  Never done    TETANUS VACCINE  11/19/2029    Lipid Panel  Completed        Alba Jamison" was seen today for abdominal pain.    Diagnoses and all orders for this visit:    Abdominal pain, unspecified abdominal location  -     Urinalysis Microscopic  -     Urine culture    Irritable bowel syndrome, unspecified type        -     Start Benefiber at 1 packet daily in 8+ oz fluid/water  -     dicyclomine (BENTYL) 10 MG capsule; 1 cap every 6-8 hours as needed  -     Consider Lab: CBC,CMP,TSH-pt defers at present  -      If no better, order CT Abdomen/Pelvis, ?C-scope    Gastroesophageal reflux disease, unspecified whether esophagitis present        -     Recommend trial of Nexium 20mg QD x 1-2 weeks         "

## 2024-09-06 LAB — BACTERIA UR CULT: NO GROWTH
